# Patient Record
(demographics unavailable — no encounter records)

---

## 2025-02-03 NOTE — HISTORY OF PRESENT ILLNESS
[de-identified] : 74 yo male with h/o HTN, HLD and myositis causing lower extremity weakness presents for evaluation of an abdominal wall bulge which he states gradually increased s/p surgery last May for removal of a retroperitoneal cyst as well as cholecystectomy. He states that because of his myositis he is very dependent on his abdominal wall to help get out of bed. He denies any nausea or vomiting. no prior h/o hernia repair in the past.

## 2025-02-03 NOTE — ASSESSMENT
[FreeTextEntry1] : 74 yo male with an incisional hernia. Discussed robotic assisted repair with possible overnight stay.  -clearance

## 2025-02-03 NOTE — PHYSICAL EXAM
[Respiratory Effort] : normal respiratory effort [Alert] : alert [Oriented to Person] : oriented to person [Oriented to Place] : oriented to place [Oriented to Time] : oriented to time [Calm] : calm [JVD] : no jugular venous distention  [Abdominal Masses] : No abdominal masses [Abdomen Tenderness] : ~T ~M No abdominal tenderness [de-identified] : KRISTIAN BROWN NAD [de-identified] : EOMI [de-identified] : soft NT, ND + midline incision with 4-5 cm defect and smaller defect adjacent.

## 2025-05-19 NOTE — ASSESSMENT
[FreeTextEntry1] : Patient is doing well, no c/o pain. Tolerating a diet.   incisions are c/d/i + seroma appreciated over hernia site.   f/u 3 weeks

## 2025-06-25 NOTE — ASSESSMENT
[FreeTextEntry1] : Patient is doing well no complaints.   incisions are well healed. seroma present but much smaller. 10cc serous fluid aspirated.   f/u PRN